# Patient Record
Sex: FEMALE | Race: WHITE
[De-identification: names, ages, dates, MRNs, and addresses within clinical notes are randomized per-mention and may not be internally consistent; named-entity substitution may affect disease eponyms.]

---

## 2021-07-03 ENCOUNTER — HOSPITAL ENCOUNTER (EMERGENCY)
Dept: HOSPITAL 8 - ED | Age: 26
Discharge: HOME | End: 2021-07-03
Payer: COMMERCIAL

## 2021-07-03 VITALS — DIASTOLIC BLOOD PRESSURE: 63 MMHG | SYSTOLIC BLOOD PRESSURE: 123 MMHG

## 2021-07-03 VITALS — HEIGHT: 67 IN | WEIGHT: 145.51 LBS | BODY MASS INDEX: 22.84 KG/M2
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
## 2021-07-03 DIAGNOSIS — S60.221A: Primary | ICD-10-CM
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
## 2021-07-03 PROCEDURE — 99283 EMERGENCY DEPT VISIT LOW MDM: CPT

## 2021-07-03 NOTE — NUR
PT AMBULATORY TO ROOM 22 W/ C/O L THUMB PAIN, SWELLING, BRUISING. PT STATES SHE 
WAS HOLDING HER PHONE WHILE WATCHING HER FATHER PLAY SOFTBALL WHEN THE SOFTBALL 
WENT INTO THE AIR AND LANDED ON PT'S L THUMB. PT C/O L THUMB PAIN, SWELLING, 
BRUISING AND PAIN RADIATING TO PALM OF HAND. NO ROM OF THUMB. PT RESTING ON 
GURNEY. NADN. MONITORS APPLIED. VSS. WARM BLANKET PROVIDED. CALL LIGHT IN 
REACH.